# Patient Record
Sex: MALE | Race: OTHER | HISPANIC OR LATINO | Employment: UNEMPLOYED | URBAN - METROPOLITAN AREA
[De-identification: names, ages, dates, MRNs, and addresses within clinical notes are randomized per-mention and may not be internally consistent; named-entity substitution may affect disease eponyms.]

---

## 2019-05-16 ENCOUNTER — ANESTHESIA EVENT (OUTPATIENT)
Dept: GASTROENTEROLOGY | Facility: HOSPITAL | Age: 59
End: 2019-05-16

## 2019-05-16 RX ORDER — LISINOPRIL 20 MG/1
20 TABLET ORAL DAILY
COMMUNITY

## 2019-05-16 RX ORDER — ASPIRIN 81 MG/1
81 TABLET ORAL DAILY
COMMUNITY

## 2019-05-16 RX ORDER — ATORVASTATIN CALCIUM 20 MG/1
20 TABLET, FILM COATED ORAL DAILY
COMMUNITY

## 2019-05-16 RX ORDER — LISINOPRIL AND HYDROCHLOROTHIAZIDE 25; 20 MG/1; MG/1
1 TABLET ORAL DAILY
COMMUNITY

## 2019-05-16 RX ORDER — PANTOPRAZOLE SODIUM 40 MG/1
40 TABLET, DELAYED RELEASE ORAL DAILY
COMMUNITY
End: 2020-10-12 | Stop reason: SDUPTHER

## 2019-05-17 ENCOUNTER — HOSPITAL ENCOUNTER (OUTPATIENT)
Dept: GASTROENTEROLOGY | Facility: HOSPITAL | Age: 59
Setting detail: OUTPATIENT SURGERY
Discharge: HOME | End: 2019-05-17
Admitting: INTERNAL MEDICINE
Payer: COMMERCIAL

## 2019-05-17 ENCOUNTER — ANESTHESIA (OUTPATIENT)
Dept: GASTROENTEROLOGY | Facility: HOSPITAL | Age: 59
End: 2019-05-17

## 2019-05-17 VITALS
BODY MASS INDEX: 29.49 KG/M2 | HEIGHT: 70 IN | OXYGEN SATURATION: 98 % | SYSTOLIC BLOOD PRESSURE: 112 MMHG | HEART RATE: 68 BPM | WEIGHT: 206 LBS | RESPIRATION RATE: 16 BRPM | DIASTOLIC BLOOD PRESSURE: 74 MMHG

## 2019-05-17 DIAGNOSIS — I10 ESSENTIAL (PRIMARY) HYPERTENSION: ICD-10-CM

## 2019-05-17 DIAGNOSIS — K21.9 GASTRO-ESOPHAGEAL REFLUX DISEASE WITHOUT ESOPHAGITIS: ICD-10-CM

## 2019-05-17 DIAGNOSIS — I11.9 HYPERTENSIVE HEART DISEASE WITHOUT HEART FAILURE: ICD-10-CM

## 2019-05-17 DIAGNOSIS — E66.3 OVERWEIGHT: ICD-10-CM

## 2019-05-17 PROCEDURE — 88305 TISSUE EXAM BY PATHOLOGIST: CPT | Performed by: PATHOLOGY

## 2019-05-17 PROCEDURE — 88342 IMHCHEM/IMCYTCHM 1ST ANTB: CPT | Performed by: PATHOLOGY

## 2019-05-17 RX ORDER — SODIUM CHLORIDE 9 MG/ML
125 INJECTION, SOLUTION INTRAVENOUS CONTINUOUS
Status: DISCONTINUED | OUTPATIENT
Start: 2019-05-17 | End: 2019-05-21 | Stop reason: HOSPADM

## 2019-05-17 RX ORDER — PROPOFOL 10 MG/ML
INJECTION, EMULSION INTRAVENOUS AS NEEDED
Status: DISCONTINUED | OUTPATIENT
Start: 2019-05-17 | End: 2019-05-17 | Stop reason: SURG

## 2019-05-17 RX ADMIN — PROPOFOL 150 MG: 10 INJECTION, EMULSION INTRAVENOUS at 07:44

## 2019-05-17 RX ADMIN — SODIUM CHLORIDE 125 ML/HR: 0.9 INJECTION, SOLUTION INTRAVENOUS at 07:20

## 2019-05-17 RX ADMIN — PROPOFOL 50 MG: 10 INJECTION, EMULSION INTRAVENOUS at 07:46

## 2022-11-14 ENCOUNTER — HOSPITAL ENCOUNTER (EMERGENCY)
Facility: HOSPITAL | Age: 62
Discharge: HOME | End: 2022-11-14
Attending: EMERGENCY MEDICINE

## 2022-11-14 VITALS
WEIGHT: 200 LBS | RESPIRATION RATE: 18 BRPM | HEART RATE: 68 BPM | OXYGEN SATURATION: 100 % | TEMPERATURE: 97.7 F | DIASTOLIC BLOOD PRESSURE: 80 MMHG | SYSTOLIC BLOOD PRESSURE: 139 MMHG | BODY MASS INDEX: 28.7 KG/M2

## 2022-11-14 DIAGNOSIS — J32.9 SINUSITIS: Primary | ICD-10-CM

## 2022-11-14 RX ORDER — FLUTICASONE PROPIONATE 50 MCG
1 SPRAY, SUSPENSION (ML) NASAL DAILY
Qty: 16 G | Refills: 0 | Status: SHIPPED | OUTPATIENT
Start: 2022-11-14

## 2022-11-14 RX ORDER — IBUPROFEN 400 MG/1
400 TABLET ORAL EVERY 6 HOURS PRN
Qty: 12 TABLET | Refills: 0 | Status: SHIPPED | OUTPATIENT
Start: 2022-11-14

## 2022-11-14 RX ORDER — AMOXICILLIN AND CLAVULANATE POTASSIUM 875; 125 MG/1; MG/1
1 TABLET, FILM COATED ORAL EVERY 12 HOURS SCHEDULED
Qty: 14 TABLET | Refills: 0 | Status: SHIPPED | OUTPATIENT
Start: 2022-11-14 | End: 2022-11-21

## 2022-11-15 NOTE — ED PROVIDER NOTES
History  Chief Complaint   Patient presents with   • Sinus Problem     Pt reports (via ) that he has chronic sinus problems, and thinks he has another sinus infection  Reports nasal congestion since Thursday  60-year-old gentleman presents with complaint of recurrent sinus infection  He has a history of similar issues in the past   He complains congestion and discomfort  Symptoms began on Thursday and have been worsening since that time  In the past he has improved with antibiotics prescription medications  Sinus Problem  Pain details:     Location:  Frontal and maxillary    Quality:  Aching    Severity:  Moderate    Timing:  Constant  Progression:  Worsening  Chronicity:  Recurrent  Relieved by:  Nothing  Worsened by:  Nothing  Ineffective treatments:  None tried  Associated symptoms: congestion    Associated symptoms: no chest pain, no chills, no cough, no fever, no hoarse voice, no shortness of breath, no vomiting and no wheezing        Prior to Admission Medications   Prescriptions Last Dose Informant Patient Reported? Taking?   aspirin (ECOTRIN LOW STRENGTH) 81 mg EC tablet   Yes No   Sig: Take 81 mg by mouth daily   atorvastatin (LIPITOR) 20 mg tablet   Yes No   Sig: Take 20 mg by mouth daily   lisinopril (ZESTRIL) 20 mg tablet   Yes No   Sig: Take 20 mg by mouth daily   lisinopril-hydrochlorothiazide (PRINZIDE,ZESTORETIC) 20-25 MG per tablet   Yes No   Sig: Take 1 tablet by mouth daily   metoprolol tartrate (LOPRESSOR) 25 mg tablet   Yes No   Sig: Take 25 mg by mouth every 12 (twelve) hours   pantoprazole (PROTONIX) 40 mg tablet   No No   Sig: TAKE 1 TABLET (40 MG TOTAL) BY MOUTH DAILY      Facility-Administered Medications: None       Past Medical History:   Diagnosis Date   • GERD (gastroesophageal reflux disease)    • Hyperlipidemia    • Hypertension    • Rectal bleeding        Past Surgical History:   Procedure Laterality Date   • COLONOSCOPY         History reviewed   No pertinent family history  I have reviewed and agree with the history as documented  E-Cigarette/Vaping     E-Cigarette/Vaping Substances     Social History     Tobacco Use   • Smoking status: Never Smoker   • Smokeless tobacco: Never Used   Substance Use Topics   • Alcohol use: Yes     Comment: social   • Drug use: Not Currently       Review of Systems   Constitutional: Negative for chills and fever  HENT: Positive for congestion  Negative for hoarse voice  Respiratory: Negative for cough, shortness of breath and wheezing  Cardiovascular: Negative for chest pain  Gastrointestinal: Negative for vomiting  All other systems reviewed and are negative  Physical Exam  Physical Exam  Vitals and nursing note reviewed  Constitutional:       General: He is not in acute distress  Appearance: Normal appearance  He is well-developed  He is not ill-appearing, toxic-appearing or diaphoretic  HENT:      Head: Normocephalic and atraumatic  Jaw: There is normal jaw occlusion  Right Ear: External ear normal       Left Ear: External ear normal       Nose: Congestion present  No rhinorrhea  Right Turbinates: Enlarged  Left Turbinates: Enlarged  Right Sinus: Maxillary sinus tenderness and frontal sinus tenderness present  Left Sinus: Maxillary sinus tenderness and frontal sinus tenderness present  Comments: Mild sinus tenderness       Mouth/Throat:      Mouth: Mucous membranes are moist       Pharynx: Oropharynx is clear  No oropharyngeal exudate or posterior oropharyngeal erythema  Eyes:      Conjunctiva/sclera: Conjunctivae normal       Pupils: Pupils are equal, round, and reactive to light  Cardiovascular:      Rate and Rhythm: Normal rate and regular rhythm  Heart sounds: Normal heart sounds  Pulmonary:      Effort: Pulmonary effort is normal  No respiratory distress  Breath sounds: Normal breath sounds     Abdominal:      General: Bowel sounds are normal  There is no distension  Palpations: Abdomen is soft  Tenderness: There is no abdominal tenderness  There is no guarding  Musculoskeletal:         General: Normal range of motion  Cervical back: Neck supple  No rigidity  Right lower leg: No edema  Left lower leg: No edema  Skin:     General: Skin is warm and dry  Capillary Refill: Capillary refill takes less than 2 seconds  Neurological:      General: No focal deficit present  Mental Status: He is alert and oriented to person, place, and time  Psychiatric:         Mood and Affect: Mood normal          Behavior: Behavior normal          Vital Signs  ED Triage Vitals [11/14/22 2151]   Temperature Pulse Respirations Blood Pressure SpO2   97 7 °F (36 5 °C) 68 18 139/80 100 %      Temp Source Heart Rate Source Patient Position - Orthostatic VS BP Location FiO2 (%)   Oral Monitor Sitting Left arm --      Pain Score       --           Vitals:    11/14/22 2151   BP: 139/80   Pulse: 68   Patient Position - Orthostatic VS: Sitting         Visual Acuity      ED Medications  Medications - No data to display    Diagnostic Studies  Results Reviewed     None                 No orders to display              Procedures  Procedures         ED Course                                             MDM    Disposition  Final diagnoses:   Sinusitis     Time reflects when diagnosis was documented in both MDM as applicable and the Disposition within this note     Time User Action Codes Description Comment    11/14/2022 11:30 PM Stephy Trinidad Add [J32 9] Sinusitis       ED Disposition     ED Disposition   Discharge    Condition   Stable    Date/Time   Mon Nov 14, 2022 11:30 PM    Comment   Danilo Police discharge to home/self care                 Follow-up Information    None         Discharge Medication List as of 11/14/2022 11:32 PM      START taking these medications    Details   amoxicillin-clavulanate (AUGMENTIN) 875-125 mg per tablet Take 1 tablet by mouth every 12 (twelve) hours for 7 days, Starting Mon 11/14/2022, Until Mon 11/21/2022, Print      fluticasone (FLONASE) 50 mcg/act nasal spray 1 spray into each nostril daily, Starting Mon 11/14/2022, Print      ibuprofen (MOTRIN) 400 mg tablet Take 1 tablet (400 mg total) by mouth every 6 (six) hours as needed for mild pain, Starting Mon 11/14/2022, Print         CONTINUE these medications which have NOT CHANGED    Details   aspirin (ECOTRIN LOW STRENGTH) 81 mg EC tablet Take 81 mg by mouth daily, Historical Med      atorvastatin (LIPITOR) 20 mg tablet Take 20 mg by mouth daily, Historical Med      lisinopril (ZESTRIL) 20 mg tablet Take 20 mg by mouth daily, Historical Med      lisinopril-hydrochlorothiazide (PRINZIDE,ZESTORETIC) 20-25 MG per tablet Take 1 tablet by mouth daily, Historical Med      metoprolol tartrate (LOPRESSOR) 25 mg tablet Take 25 mg by mouth every 12 (twelve) hours, Historical Med      pantoprazole (PROTONIX) 40 mg tablet TAKE 1 TABLET (40 MG TOTAL) BY MOUTH DAILY, Starting Fri 5/27/2022, Normal             No discharge procedures on file      PDMP Review     None          ED Provider  Electronically Signed by           Laura Shetty DO  11/14/22 3486

## 2022-11-19 ENCOUNTER — HOSPITAL ENCOUNTER (EMERGENCY)
Facility: HOSPITAL | Age: 62
Discharge: HOME | End: 2022-11-19
Attending: EMERGENCY MEDICINE

## 2022-11-19 VITALS
OXYGEN SATURATION: 98 % | WEIGHT: 202.4 LBS | DIASTOLIC BLOOD PRESSURE: 83 MMHG | BODY MASS INDEX: 29.04 KG/M2 | RESPIRATION RATE: 18 BRPM | TEMPERATURE: 98.7 F | SYSTOLIC BLOOD PRESSURE: 145 MMHG | HEART RATE: 97 BPM

## 2022-11-19 DIAGNOSIS — B34.9 VIRAL INFECTION: Primary | ICD-10-CM

## 2022-11-19 LAB — SARS-COV-2 RNA RESP QL NAA+PROBE: POSITIVE

## 2022-11-19 RX ORDER — NIRMATRELVIR AND RITONAVIR 300-100 MG
3 KIT ORAL 2 TIMES DAILY
Qty: 30 TABLET | Refills: 0 | Status: SHIPPED | OUTPATIENT
Start: 2022-11-19 | End: 2022-11-24

## 2022-11-20 NOTE — ED PROVIDER NOTES
History  Chief Complaint   Patient presents with   • Medical Problem     Pt arrives with significant other  Reports positive home covid test  Wants to be checked  45-year-old male with history of hypertension and hyperlipidemia presents with wife complaining of COVID like symptoms after a positive contact yesterday  Patient states he took it at home test he thinks was positive but is unsure  Admits to mild shortness of breath without chest pain as well as a cough and body aches  Denies any other complaints  Requesting Paxlovid      History provided by:  Patient   used: No        Prior to Admission Medications   Prescriptions Last Dose Informant Patient Reported? Taking?   amoxicillin-clavulanate (AUGMENTIN) 875-125 mg per tablet   No No   Sig: Take 1 tablet by mouth every 12 (twelve) hours for 7 days   aspirin (ECOTRIN LOW STRENGTH) 81 mg EC tablet   Yes No   Sig: Take 81 mg by mouth daily   atorvastatin (LIPITOR) 20 mg tablet   Yes No   Sig: Take 20 mg by mouth daily   fluticasone (FLONASE) 50 mcg/act nasal spray   No No   Si spray into each nostril daily   ibuprofen (MOTRIN) 400 mg tablet   No No   Sig: Take 1 tablet (400 mg total) by mouth every 6 (six) hours as needed for mild pain   lisinopril (ZESTRIL) 20 mg tablet   Yes No   Sig: Take 20 mg by mouth daily   lisinopril-hydrochlorothiazide (PRINZIDE,ZESTORETIC) 20-25 MG per tablet   Yes No   Sig: Take 1 tablet by mouth daily   metoprolol tartrate (LOPRESSOR) 25 mg tablet   Yes No   Sig: Take 25 mg by mouth every 12 (twelve) hours   pantoprazole (PROTONIX) 40 mg tablet   No No   Sig: TAKE 1 TABLET (40 MG TOTAL) BY MOUTH DAILY      Facility-Administered Medications: None       Past Medical History:   Diagnosis Date   • GERD (gastroesophageal reflux disease)    • Hyperlipidemia    • Hypertension    • Rectal bleeding        Past Surgical History:   Procedure Laterality Date   • COLONOSCOPY         History reviewed   No pertinent family history  I have reviewed and agree with the history as documented  E-Cigarette/Vaping     E-Cigarette/Vaping Substances     Social History     Tobacco Use   • Smoking status: Never   • Smokeless tobacco: Never   Substance Use Topics   • Alcohol use: Yes     Comment: social   • Drug use: Not Currently       Review of Systems   Constitutional: Positive for fatigue  Negative for chills  HENT: Negative for ear pain and sore throat  Eyes: Negative for photophobia and redness  Respiratory: Positive for cough  Negative for apnea and shortness of breath  Cardiovascular: Negative for chest pain  Gastrointestinal: Negative for abdominal pain, nausea and vomiting  Genitourinary: Negative for dysuria  Musculoskeletal: Negative for arthralgias, neck pain and neck stiffness  Skin: Negative for rash  Neurological: Negative for dizziness, tremors, syncope and weakness  Psychiatric/Behavioral: Negative for suicidal ideas  Physical Exam  Physical Exam  Constitutional:       General: He is not in acute distress  Appearance: He is well-developed and well-nourished  He is not diaphoretic  HENT:      Mouth/Throat:      Mouth: Oropharynx is clear and moist    Eyes:      Extraocular Movements: EOM normal       Pupils: Pupils are equal, round, and reactive to light  Cardiovascular:      Rate and Rhythm: Normal rate and regular rhythm  Pulmonary:      Effort: Pulmonary effort is normal  No respiratory distress  Breath sounds: Normal breath sounds  Abdominal:      General: Bowel sounds are normal  There is no distension  Palpations: Abdomen is soft  Musculoskeletal:         General: Normal range of motion  Cervical back: Normal range of motion and neck supple  Skin:     General: Skin is warm and dry  Neurological:      Mental Status: He is alert and oriented to person, place, and time     Psychiatric:         Mood and Affect: Mood and affect normal          Vital Signs  ED Triage Vitals [11/19/22 2145]   Temperature Pulse Respirations Blood Pressure SpO2   98 7 °F (37 1 °C) 97 18 145/83 98 %      Temp Source Heart Rate Source Patient Position - Orthostatic VS BP Location FiO2 (%)   Oral Monitor Sitting Right arm --      Pain Score       --           Vitals:    11/19/22 2145   BP: 145/83   Pulse: 97   Patient Position - Orthostatic VS: Sitting         Visual Acuity      ED Medications  Medications - No data to display    Diagnostic Studies  Results Reviewed     Procedure Component Value Units Date/Time    COVID only [[id number]]  (Abnormal) Collected: 11/19/22 2205    Lab Status: Final result Specimen: Nares from Nose Updated: 11/19/22 2238     SARS-CoV-2 Positive    Narrative:      FOR PEDIATRIC PATIENTS - copy/paste COVID Guidelines URL to browser: [email or web address]  [employer]    SARS-CoV-2 assay is a Nucleic Acid Amplification assay intended for the  qualitative detection of nucleic acid from SARS-CoV-2 in nasopharyngeal  swabs  Results are for the presumptive identification of SARS-CoV-2 RNA  Positive results are indicative of infection with SARS-CoV-2, the virus  causing COVID-19, but do not rule out bacterial infection or co-infection  with other viruses  Laboratories within the [place] and its  territories are required to report all positive results to the appropriate  public health authorities  Negative results do not preclude SARS-CoV-2  infection and should not be used as the sole basis for treatment or other  patient management decisions  Negative results must be combined with  clinical observations, patient history, and epidemiological information  This test has not been FDA cleared or approved  This test has been authorized by FDA under an Emergency Use Authorization  (EUA)   This test is only authorized for the duration of time the  declaration that circumstances exist justifying the authorization of the  emergency use of an in vitro diagnostic tests for detection of SARS-CoV-2  virus and/or diagnosis of COVID-19 infection under section 564(b)(1) of  the Act, 21 U  S C  537ALX-0(X)(9), unless the authorization is terminated  or revoked sooner  The test has been validated but independent review by FDA  and CLIA is pending  Test performed using [employer] [facility] Gene[employer]pert: This RT-PCR assay targets N2,  a region unique to SARS-CoV-2  A conserved region in the E-gene was chosen  for pan-Sarbecovirus detection which includes SARS-CoV-2  According to CMS-2020-01-R, this platform meets the definition of high-throughput technology  No orders to display              Procedures  Procedures         ED Course                               SBIRT 20yo+    Flowsheet Row Most Recent Value   SBIRT (23 yo +)    In order to provide better care to our patients, we are screening all of our patients for alcohol and drug use  Would it be okay to ask you these screening questions? Yes Filed at: 11/19/2022 2221   Initial Alcohol Screen: US AUDIT-C     1  How often do you have a drink containing alcohol? 0 Filed at: 11/19/2022 2221   2  How many drinks containing alcohol do you have on a typical day you are drinking? 0 Filed at: 11/19/2022 2221   3a  Male UNDER 65: How often do you have five or more drinks on one occasion? 0 Filed at: 11/19/2022 2221   Audit-C Score 0 Filed at: 11/19/2022 2221   TEVIN: How many times in the past year have you    Used an illegal drug or used a prescription medication for non-medical reasons? Never Filed at: 11/19/2022 2221                    MDM  Number of Diagnoses or Management Options  Viral infection: new and does not require workup  Diagnosis management comments: Pt had hx and physical exam consistent with acute viral infection  COVID test pending  No focal signs of infection on exam warranting antibiotics  Patient denies chest pain or shortness of breath    Appears well on exam   Hemodynamically stable  Educated extensively on supportive care and return precautions and demonstrates understanding  Stable for discharge home  Amount and/or Complexity of Data Reviewed  Clinical lab tests: ordered    Risk of Complications, Morbidity, and/or Mortality  Presenting problems: moderate  Diagnostic procedures: moderate  Management options: moderate    Patient Progress  Patient progress: stable      Disposition  Final diagnoses:   Viral infection     Time reflects when diagnosis was documented in both MDM as applicable and the Disposition within this note     Time User Action Codes Description Comment    11/19/2022 10:13 PM Abi White Add [B34 9] Viral infection       ED Disposition     ED Disposition   Discharge    Condition   Stable    Date/Time   Sat Nov 19, 2022 10:13 PM    Comment   Dorene Pascual discharge to home/self care                 Follow-up Information     Follow up With Specialties Details Why Contact Info Additional [address] [facility] ENT Otolaryngology Go to  If symptoms worsen [address] [facility] [address]  [address], [address] [place] [address], [id number] [phone]          Discharge Medication List as of 11/19/2022 10:16 PM      START taking these medications    Details   dextromethorphan-guaifenesin (MUCINEX DM)  MG per 12 hr tablet Take 1 tablet by mouth every 12 (twelve) hours, Starting Sat 11/19/2022, Normal      nirmatrelvir & ritonavir (Paxlovid, 300/100,) tablet therapy pack Take 3 tablets by mouth 2 (two) times a day for 5 days Take 2 nirmatrelvir tablets + 1 ritonavir tablet together per dose, Starting Sat 11/19/2022, Until Thu 11/24/2022, Normal         CONTINUE these medications which have NOT CHANGED    Details   amoxicillin-clavulanate (AUGMENTIN) 875-125 mg per tablet Take 1 tablet by mouth every 12 (twelve) hours for 7 days, Starting Mon 11/14/2022, Until Mon 11/21/2022, Print      aspirin (ECOTRIN LOW STRENGTH) 81 mg EC tablet Take 81 mg by mouth daily, Historical Med      atorvastatin (LIPITOR) 20 mg tablet Take 20 mg by mouth daily, Historical Med      fluticasone (FLONASE) 50 mcg/act nasal spray 1 spray into each nostril daily, Starting Mon 11/14/2022, Print      ibuprofen (MOTRIN) 400 mg tablet Take 1 tablet (400 mg total) by mouth every 6 (six) hours as needed for mild pain, Starting Mon 11/14/2022, Print      lisinopril (ZESTRIL) 20 mg tablet Take 20 mg by mouth daily, Historical Med      lisinopril-hydrochlorothiazide (PRINZIDE,ZESTORETIC) 20-25 MG per tablet Take 1 tablet by mouth daily, Historical Med      metoprolol tartrate (LOPRESSOR) 25 mg tablet Take 25 mg by mouth every 12 (twelve) hours, Historical Med      pantoprazole (PROTONIX) 40 mg tablet TAKE 1 TABLET (40 MG TOTAL) BY MOUTH DAILY, Starting Fri 5/27/2022, Normal             No discharge procedures on file      PDMP Review     None          ED Provider  Electronically Signed by           Caesar Candelaria PA-C  11/19/22 8974